# Patient Record
(demographics unavailable — no encounter records)

---

## 2024-10-16 NOTE — HISTORY OF PRESENT ILLNESS
[FreeTextEntry1] : Dinah is here for a f/u visit. Overall, she endorses significant improvement in her symptoms.  She is using GBL10/5% topical cream BID, B20 suppositories PV BID. She finds the both very effective.  She is using topical lidocaine as needed throughout the day.  She is doing HEP at home. She is using wand at home for IM/MFR. She is going to PFPT with Carmelina weekly at [facility]. She reports improvement in her PFM spasms with B20 suppositories. She reports significant improvement in her burning and pain. She does not think she needs PNB today. No longer has increased burning just prior to menses.  She would like to try po medication to mitigate any further burning.  In the past she has tried: Duloxetine 60mg helped to get her back to baseline. She stopped 2/2 AE (dysphoria) Lyrica caused night terrors. Gabapentin helped but caused nightmares.  Standing gives her relief.  She is using a standing desk at work which helps. Is sexually active with new partner. Denies both entry and deep dyspareunia. Able to insert tampon and wear without pain.  Uses sitz baths which helps. Uses ice when needed.  She is using pelvic wand at home for IM/MFR. She did not yet restart PFPT. She is still on waiting list since March 2024.   She denies urinary symptoms. She denies symptoms of vaginal infection.

## 2024-10-16 NOTE — DISCUSSION/SUMMARY
[FreeTextEntry1] : Continue GKL 10/5% topical cream ftp to introitus and L interlabial sulci BID.  Continue B20 suppositories PV BID as needed. Lidocaine 7.5% in lipoderm thin coat to vulva prn up to 3x/day for comfort.  Continue PFPT weekly with Aislynn. Continue PFM relaxation and behavioral modifications. Continue to use standing desk. Suggested PN pillow for prolonged sitting.  Continue to use pelvic wand for IM/MFT at home.  Nortriptyline 10-30mg po QPM. Titration schedule given. SE rev'd including but not limited to constipation, dry mouth, weight gain, vivid dreams, palpitations, dizziness, drowsiness. Pt verbalized understanding.  Medical accommodation paperwork for her job filled out today.  RTO 2 months

## 2024-10-16 NOTE — PHYSICAL EXAM
[No Acute Distress] : in no acute distress [Well developed] : well developed [Well Nourished] : ~L well nourished [Good Hygeine] : demonstrates good hygeine [Oriented x3] : oriented to person, place, and time [Normal Memory] : ~T memory was ~L unimpaired [Normal Mood/Affect] : mood and affect are normal [Respirations regular] : ~T respiratory rate was regular [No Edema] : ~T edema was not present [Warm and Dry] : was warm and dry to touch [Normal Gait] : gait was normal [No Lesions] : no lesions were seen on the external genitalia [Labia Majora] : were normal [Labia Minora] : were normal [Normal Appearance] : general appearance was normal [Pink Rugae] : pink rugae [Normal] : no abnormalities [Exam Deferred] : was deferred [No Bleeding] : there was no active vaginal bleeding [Tenderness] : ~T no ~M abdominal tenderness observed [Distended] : not distended [de-identified] : Q-tip touch test negative. No erythema, no erosions, no ulcerations, no fissures. [de-identified] : No mild burning with q-tip palpation of L interlabial sulci  [FreeTextEntry4] : PFMs 2/4, supple, nontender to palpation; Not able to produce pain/burning to palpation over area of PN on L; significantly improved compared to previous exams

## 2024-12-18 NOTE — PHYSICAL EXAM
[No Acute Distress] : in no acute distress [Well developed] : well developed [Well Nourished] : ~L well nourished [Good Hygeine] : demonstrates good hygeine [Oriented x3] : oriented to person, place, and time [Normal Memory] : ~T memory was ~L unimpaired [Normal Mood/Affect] : mood and affect are normal [Respirations regular] : ~T respiratory rate was regular [No Edema] : ~T edema was not present [Warm and Dry] : was warm and dry to touch [Normal Gait] : gait was normal [No Lesions] : no lesions were seen on the external genitalia [Labia Majora] : were normal [Labia Minora] : were normal [Normal Appearance] : general appearance was normal [Pink Rugae] : pink rugae [No Bleeding] : there was no active vaginal bleeding [Normal] : no abnormalities [Exam Deferred] : was deferred [Tenderness] : ~T no ~M abdominal tenderness observed [Distended] : not distended [de-identified] : Q-tip touch test 2/10 @ 5,6,7. No erythema, no erosions, no ulcerations, no fissures. [de-identified] :  Mild burning with q-tip palpation of L interlabial sulci  [FreeTextEntry4] : PFMs 2/4, supple with mild tender points b/l IC/C; Not able to produce pain/burning to palpation over area of PN on L

## 2024-12-18 NOTE — DISCUSSION/SUMMARY
[FreeTextEntry1] : Continue GKL 10/5% topical cream ftp to introitus and L interlabial sulci BID.  d/c B20 suppositories  Orphenadrine 100mg suppositories PV qhs. Lidocaine 7.5% in lipoderm thin coat to vulva prn up to 3x/day for comfort.  Continue PFM relaxation and behavioral modifications. Continue to use standing desk. Suggested PN pillow for prolonged sitting.  Continue to use pelvic wand for IM/MFT at home.  Decrease nortriptyline to 20mg po QPM. If continued palpitations, decrease to 10mg x 1 week then d/c. If no improvement at next visit, will d/c  RTO 2 months

## 2024-12-18 NOTE — HISTORY OF PRESENT ILLNESS
[FreeTextEntry1] : Dinah is here for a f/u visit. Overall, she endorses significant improvement in her symptoms.  She is using GBL10/5% topical cream BID, B20 suppositories PV BID. She finds the both very effective.  She is using topical lidocaine as needed throughout the day.  She is doing HEP at home. She is using wand at home for IM/MFR. She stopped PFPT with Aislynn due to insurance change. She reports significant improvement in her burning and pain. She does not think she needs PNB today. No longer has increased burning just prior to menses.  She is taking nortriptyline 30mg but when she increased to 30mg she started to have palpitations. She has not seen much improvement with nortriptyline. In the past she has tried: Duloxetine 60mg helped to get her back to baseline. She stopped 2/2 AE (dysphoria) Lyrica caused night terrors. Gabapentin helped but caused nightmares.  Standing gives her relief.  She is using a standing desk at work which helps. Is sexually active with new partner. Denies both entry and deep dyspareunia. Able to insert tampon and wear without pain.  Uses sitz baths which helps. Uses ice when needed.  She is using pelvic wand at home for IM/MFR.   She denies urinary symptoms. She denies symptoms of vaginal infection.

## 2025-03-07 NOTE — PHYSICAL EXAM
[No Acute Distress] : in no acute distress [Well developed] : well developed [Well Nourished] : ~L well nourished [Good Hygeine] : demonstrates good hygeine [Oriented x3] : oriented to person, place, and time [Normal Memory] : ~T memory was ~L unimpaired [Normal Mood/Affect] : mood and affect are normal [Respirations regular] : ~T respiratory rate was regular [No Edema] : ~T edema was not present [Warm and Dry] : was warm and dry to touch [Normal Gait] : gait was normal [No Lesions] : no lesions were seen on the external genitalia [Labia Majora] : were normal [Labia Minora] : were normal [Normal Appearance] : general appearance was normal [Pink Rugae] : pink rugae [No Bleeding] : there was no active vaginal bleeding [Normal] : no abnormalities [Exam Deferred] : was deferred [Tenderness] : ~T no ~M abdominal tenderness observed [Distended] : not distended [de-identified] : Q-tip touch test 2/10 @ 5,6,7. No erythema, no erosions, no ulcerations, no fissures. [de-identified] :  No burning with q-tip palpation of L interlabial sulci  [FreeTextEntry4] : PFMs 2/4, supple with MTrPs b/l IC/C; Not able to produce pain/burning to palpation over area of PN on L

## 2025-03-07 NOTE — HISTORY OF PRESENT ILLNESS
Stroke RF  SBP goal <180  Management per primary team   [FreeTextEntry1] : Dinah is here for a f/u visit. Overall, she endorses significant improvement in her symptoms.  She is using GBL10/5% topical cream BID, B20 suppositories PV BID. She finds the both very effective.  She is using topical lidocaine as needed throughout the day. She tried Orphenadrine but it caused some pain and so switched back to B20. PNB helped but she needed a break from the needles.  She is doing HEP at home. She is using wand at home for IM/MFR. She stopped PFPT with Carmelina due to insurance change. She reports worsening of burning and pain since her last visit. No longer has increased burning just prior to menses.  She is taking nortriptyline 30mg but when she increased to 30mg she started to have palpitations. She has not seen much improvement with nortriptyline. In the past she has tried: Duloxetine 60mg helped to get her back to baseline. She stopped 2/2 AE (dysphoria) Lyrica caused night terrors. Gabapentin helped but caused nightmares.  Standing gives her relief.  She is using a standing desk at work which helps. Is sexually active with new partner.  Endorses some moderate entry dyspareunia but no deep dyspareunia. Able to insert tampon and wear without pain.  Uses sitz baths which helps. Uses ice when needed.  She is using pelvic wand at home for IM/MFR.  She denies urinary symptoms. She denies symptoms of vaginal infection.

## 2025-03-07 NOTE — DISCUSSION/SUMMARY
[FreeTextEntry1] : Increase GKL to 10/8% topical cream ftp to introitus and L interlabial sulci BID.  d/c B20 suppositories  V10 suppositories PV qhs. Onsiem understands that benzodiazepines are a CS, even in suppository form. She understands the potential for dependence/abuse, drug tolerance, potential for addiction. She understands that this class of drugs is habit-forming and [employer] regulated. The sedative effects of benzos can be potentiated by taking alcohol, sleeping pills, opioids. The decision to drive is the patient's responsibility, as benzos can affect her driving ability and ability to concentrate. The goal is to wean off benzos. The patient verbalized understanding of everything we discussed and would like to continue with medication at this time. ISTOP checked. She also understands that in order to continue to get refills on her diazepam prescription, she needs to be seen in the office at minimum every 3 months. She understands that CS are not refilled after hours, on weekends, holidays or Fridays and that she needs to call and request a refill at least a week in advance. CS will not be refilled "emergently". Continue Lidocaine 7.5% in lipoderm thin coat to vulva prn up to 3x/day for comfort.  Restart PFPT. New rx in [facility]. Onsiem will call [facility] to make appointment. Continue PFM relaxation and behavioral modifications. Continue to use standing desk. Suggested PN pillow for prolonged sitting.  Restart IM/MFR with pelvic wand at home.  RTO 3 months

## 2025-06-20 NOTE — PHYSICAL EXAM
[No Acute Distress] : in no acute distress [Well developed] : well developed [Well Nourished] : ~L well nourished [Good Hygeine] : demonstrates good hygeine [Oriented x3] : oriented to person, place, and time [Normal Memory] : ~T memory was ~L unimpaired [Normal Mood/Affect] : mood and affect are normal [Respirations regular] : ~T respiratory rate was regular [No Edema] : ~T edema was not present [Warm and Dry] : was warm and dry to touch [Normal Gait] : gait was normal [No Lesions] : no lesions were seen on the external genitalia [Labia Majora] : were normal [Labia Minora] : were normal [Normal Appearance] : general appearance was normal [Pink Rugae] : pink rugae [No Bleeding] : there was no active vaginal bleeding [Normal] : no abnormalities [Exam Deferred] : was deferred [Tenderness] : ~T no ~M abdominal tenderness observed [Distended] : not distended [de-identified] : Q-tip touch test negative. No erythema, no erosions, no ulcerations, no fissures. [de-identified] :  No burning with q-tip palpation of L interlabial sulci  [FreeTextEntry4] : PFMs 2/4, supple with MTrPs b/l C and L PC/IC/CA/OI; Not able to produce pain/burning to palpation over area of PN on L

## 2025-06-20 NOTE — HISTORY OF PRESENT ILLNESS
[FreeTextEntry1] : Dinah is here for a f/u visit. She feels her PFMs have been tighter.  She is stretching, using wand, taking warm baths and using V10 suppositories.  She is on waiting list for PFPT at hospitals.   She is using topical GKL 10/5% QD.   She is using topical lidocaine as needed throughout the day but has not needed very often. No longer has increased burning just prior to menses.  In the past she has tried: Duloxetine 60mg helped to get her back to baseline. She stopped 2/2 AE (dysphoria) Lyrica caused night terrors. Gabapentin helped but caused nightmares. Nortriptyline 30mg (palpitations).  Standing gives her relief.  She is using a standing desk at work which helps. Is not sexually active at this time. Endorses some moderate entry dyspareunia but no deep dyspareunia. Able to insert tampon and wear without pain but has not been wearing due to preference.  She denies urinary symptoms. She denies symptoms of vaginal infection.

## 2025-06-20 NOTE — DISCUSSION/SUMMARY
[FreeTextEntry1] : After PE, Onsiem elected to proceed with MTrP injections. SEE SCANNED PROCEDURE NOTE.  Continue GKL to 10/5% topical cream ftp to introitus and L interlabial sulci QD. Continue V10 suppositories PV qhs. Refilled to [facility]. ISTOP checked.  Continue Lidocaine 7.5% in lipoderm thin coat to vulva prn up to 3x/day for comfort.  Restart PFPT. New rx in [facility]. Onsiem will call [facility] to make appointment. Continue PFM relaxation and behavioral modifications. Continue to use standing desk. Suggested PN pillow for prolonged sitting.  Continue IM/MFR with pelvic wand at home.  RTO q 4-6 weeks

## 2025-06-20 NOTE — PROCEDURE
[Trigger Point Injection] : a Trigger Point Injection [Patient] : the patient [Consent Obtained] : written consent was obtained prior to the procedure and is detailed in the patient's record [None] : none [Betadine] : betadine [No] : Specimen not sent to Pathology [No Complications] : none [Tolerated Well] : the patient tolerated the procedure well [Post procedure instructions and information given] : post procedure instructions and information given and reviewed with patient. [0] : 0 [FreeTextEntry1] : SEE SCANNED PROCEDURE NOTE

## 2025-06-27 NOTE — END OF VISIT
[FreeTextEntry3] : I, Pilar Duy, acted as a scribe on behalf of Dr. Tami Lynch M.D., on 06/27/2025.   All medical entries made by the scribe were at my, Dr. Tami Lynch M.D., direction and personally dictated by me on 06/27/2025. I have reviewed the chart and agree that the record accurately reflects my personal performance of the history, physical exam, assessment and plan. I have also personally directed, reviewed, and agreed with the chart.

## 2025-06-27 NOTE — COUNSELING
Patient resting in bed, alert and oriented. Patient has sitter at the bedside. Fluids infusing. Patient calm with no complaints noted.    [Nutrition/ Exercise/ Weight Management] : nutrition, exercise, weight management [Vitamins/Supplements] : vitamins/supplements [Contraception/ Emergency Contraception/ Safe Sexual Practices] : contraception, emergency contraception, safe sexual practices

## 2025-06-27 NOTE — HISTORY OF PRESENT ILLNESS
[Currently Active] : currently active [Men] : men [Vaginal] : vaginal [No] : No [Condoms] : Condoms [Patient would like to be screened for STIs] : Patient would like to be screened for STIs [Yes] : Yes [FreeTextEntry1] : 29 year old nullip LMP 06/11/25 presents for annual exam. Denies intermenstrual bleeding, nausea, abnormal periods. Pt takes Advil for back cramps experienced the first day of menses. 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 She states she has a fibroid.  (reviewed on phone, intramural, 10mm)

## 2025-06-27 NOTE — PHYSICAL EXAM
[MA] : MA [Appropriately responsive] : appropriately responsive [Alert] : alert [No Acute Distress] : no acute distress [Soft] : soft [Non-tender] : non-tender [Non-distended] : non-distended [No HSM] : No HSM [No Lesions] : no lesions [No Mass] : no mass [Oriented x3] : oriented x3 [Examination Of The Breasts] : a normal appearance [No Discharge] : no discharge [No Masses] : no breast masses were palpable [Labia Majora] : normal [Labia Minora] : normal [Normal] : normal [Uterine Adnexae] : normal [FreeTextEntry2] : SANAM Osullivan

## 2025-06-27 NOTE — DISCUSSION/SUMMARY
[FreeTextEntry1] : - Pap obtained today - Contraceptive options reviewed; pt opted for condoms - STI testing today  PHQ9 Screenin min RTO for next annual or prn